# Patient Record
Sex: MALE
[De-identification: names, ages, dates, MRNs, and addresses within clinical notes are randomized per-mention and may not be internally consistent; named-entity substitution may affect disease eponyms.]

---

## 2023-07-27 ENCOUNTER — APPOINTMENT (OUTPATIENT)
Dept: ORTHOPEDIC SURGERY | Facility: CLINIC | Age: 58
End: 2023-07-27
Payer: COMMERCIAL

## 2023-07-27 VITALS — BODY MASS INDEX: 22.73 KG/M2 | HEIGHT: 68 IN | WEIGHT: 150 LBS

## 2023-07-27 DIAGNOSIS — Z78.9 OTHER SPECIFIED HEALTH STATUS: ICD-10-CM

## 2023-07-27 DIAGNOSIS — Z80.7 FAMILY HISTORY OF OTHER MALIGNANT NEOPLASMS OF LYMPHOID, HEMATOPOIETIC AND RELATED TISSUES: ICD-10-CM

## 2023-07-27 PROBLEM — Z00.00 ENCOUNTER FOR PREVENTIVE HEALTH EXAMINATION: Status: ACTIVE | Noted: 2023-07-27

## 2023-07-27 PROCEDURE — 99203 OFFICE O/P NEW LOW 30 MIN: CPT

## 2023-07-27 PROCEDURE — 72070 X-RAY EXAM THORAC SPINE 2VWS: CPT

## 2023-07-27 NOTE — HISTORY OF PRESENT ILLNESS
[Mid-back] : mid-back [9] : 9 [1] : 2 [Radiating] : radiating [Intermittent] : intermittent [Sleep] : sleep [Walking/activity] : walking/activity [de-identified] : 23-\par ZBIGNIEW HAMILTON is a 58 year y.o. M that is here today complaining of pain in his mid back, pt states that he is unsure of what happened but the pain started about 5 months ago. Pain only subsides when he is active. Pt visited PT and thought it didn't help but he saw improvement this week. Pt aggravates back pain when he stretches. Pt's mother  from multiple myeloma. Pain radiates laterally to the Left flank to the anterior ribs. Has had some R sided pain in at the same level, though that pain resolved with muscle relaxants. Awakens him at night. No bb dysfunction. No F/C.  [] : no [FreeTextEntry6] : soreness  [FreeTextEntry7] : into stomach  [FreeTextEntry9] : ibuprofen  [de-identified] : certain movements, stretching  [de-identified] : 06/2023 [de-identified] : 07/25/23

## 2023-07-27 NOTE — IMAGING
[de-identified] : T/L SPINE\par Inspection: No rash or lesion\par Palpation: No tenderness to palpation or spasm in bilateral thoracic and lumbar paraspinal musculature, no SI joint tenderness to palpation\par ROM: Full with no pain\par Strength: 5/5 bilateral hip flexors, knee extensors, ankle dorsiflexors, EHL, ankle plantarflexors\par Sensation: Sensation present to light touch bilateral L2-S1 distributions\par Provocative maneuvers: Negative bilateral straight leg raise  [Disc space narrowing] : Disc space narrowing

## 2023-07-27 NOTE — ASSESSMENT
[FreeTextEntry1] : Atraumatic thoracic pain, awakens him from sleep\par Concerning red flag symptom w/o radiographic abnormality\par MRI to eval for underlying lesion or occult fracture

## 2023-07-28 ENCOUNTER — APPOINTMENT (OUTPATIENT)
Dept: MRI IMAGING | Facility: CLINIC | Age: 58
End: 2023-07-28
Payer: COMMERCIAL

## 2023-07-28 PROCEDURE — 72146 MRI CHEST SPINE W/O DYE: CPT

## 2023-08-10 ENCOUNTER — APPOINTMENT (OUTPATIENT)
Dept: ORTHOPEDIC SURGERY | Facility: CLINIC | Age: 58
End: 2023-08-10
Payer: COMMERCIAL

## 2023-08-10 DIAGNOSIS — M47.814 SPONDYLOSIS W/OUT MYELOPATHY OR RADICULOPATHY, THORACIC REGION: ICD-10-CM

## 2023-08-10 PROCEDURE — 99215 OFFICE O/P EST HI 40 MIN: CPT

## 2023-08-10 NOTE — IMAGING
[Disc space narrowing] : Disc space narrowing [de-identified] : T/L SPINE Palpation: No tenderness to palpation or spasm in bilateral thoracic and lumbar paraspinal musculature, no SI joint tenderness to palpation ROM: Full with no pain Strength: 5/5 bilateral hip flexors, knee extensors, ankle dorsiflexors, EHL, ankle plantarflexors Sensation: Sensation present to light touch bilateral L2-S1 distributions Provocative maneuvers: Negative bilateral straight leg raise
